# Patient Record
Sex: MALE | Race: WHITE | NOT HISPANIC OR LATINO | URBAN - METROPOLITAN AREA
[De-identification: names, ages, dates, MRNs, and addresses within clinical notes are randomized per-mention and may not be internally consistent; named-entity substitution may affect disease eponyms.]

---

## 2018-08-03 ENCOUNTER — TELEPHONE (OUTPATIENT)
Dept: ORTHOPEDICS | Facility: CLINIC | Age: 73
End: 2018-08-03

## 2018-08-03 ENCOUNTER — TELEPHONE (OUTPATIENT)
Dept: SPINE | Facility: CLINIC | Age: 73
End: 2018-08-03

## 2018-08-03 DIAGNOSIS — M54.2 CERVICAL SPINE PAIN: Primary | ICD-10-CM

## 2018-08-03 DIAGNOSIS — M54.50 LUMBAR SPINE PAIN: ICD-10-CM

## 2018-08-03 NOTE — TELEPHONE ENCOUNTER
Ortho Telephone Triage Call  [phone]  Patient C/O:  Neck pain and back pain that radiates to legs causing muscles to cramp and affecting ability to walk. Appt with Dr. Luciano/[facility] recommended per Dr. Patten.   HX: T12-L1,  lumbosacral fusion/ 1960's per Dr. Dimas/Neurosurgery  Resolution: First available appt scheduled per JAMES Luna LPN with Dr. Luciano on 8/8/18 at 2:15pm to follow xrays/[facility] at 12:45pm. Pt states understanding. Time and location of appts confirmed with pt. Pt to fax medical reports to [facility] prior to appt. Appt slips mailed

## 2018-08-08 ENCOUNTER — HOSPITAL ENCOUNTER (OUTPATIENT)
Dept: RADIOLOGY | Facility: HOSPITAL | Age: 73
Discharge: HOME | End: 2018-08-08
Attending: ORTHOPAEDIC SURGERY
Payer: MEDICARE

## 2018-08-08 ENCOUNTER — INITIAL CONSULT (OUTPATIENT)
Dept: ORTHOPEDICS | Facility: CLINIC | Age: 73
End: 2018-08-08
Payer: MEDICARE

## 2018-08-08 VITALS — WEIGHT: 240.5 LBS

## 2018-08-08 DIAGNOSIS — M54.50 LUMBAR SPINE PAIN: ICD-10-CM

## 2018-08-08 DIAGNOSIS — I73.9 CLAUDICATION: ICD-10-CM

## 2018-08-08 DIAGNOSIS — M54.2 CERVICAL SPINE PAIN: ICD-10-CM

## 2018-08-08 DIAGNOSIS — G95.9 CERVICAL MYELOPATHY: Primary | ICD-10-CM

## 2018-08-08 PROCEDURE — 72050 X-RAY EXAM NECK SPINE 4/5VWS: CPT | Mod: TC

## 2018-08-08 PROCEDURE — 72120 X-RAY BEND ONLY L-S SPINE: CPT | Mod: 26,,, | Performed by: RADIOLOGY

## 2018-08-08 PROCEDURE — 72100 X-RAY EXAM L-S SPINE 2/3 VWS: CPT | Mod: 26,,, | Performed by: RADIOLOGY

## 2018-08-08 PROCEDURE — 72050 X-RAY EXAM NECK SPINE 4/5VWS: CPT | Mod: 26,,, | Performed by: RADIOLOGY

## 2018-08-08 PROCEDURE — 99204 OFFICE O/P NEW MOD 45 MIN: CPT | Mod: S$GLB,,, | Performed by: ORTHOPAEDIC SURGERY

## 2018-08-08 PROCEDURE — 72120 X-RAY BEND ONLY L-S SPINE: CPT | Mod: TC

## 2018-08-08 PROCEDURE — 99999 PR PBB SHADOW E&M-EST. PATIENT-LVL III: CPT | Mod: PBBFAC,,, | Performed by: ORTHOPAEDIC SURGERY

## 2018-08-08 RX ORDER — TERAZOSIN 10 MG/1
10 CAPSULE ORAL NIGHTLY
COMMUNITY

## 2018-08-08 RX ORDER — AMLODIPINE BESYLATE 10 MG/1
10 TABLET ORAL DAILY
COMMUNITY

## 2018-08-08 RX ORDER — PANTOPRAZOLE SODIUM 40 MG/1
40 TABLET, DELAYED RELEASE ORAL NIGHTLY PRN
COMMUNITY

## 2018-08-08 RX ORDER — TIZANIDINE HYDROCHLORIDE 2 MG/1
1 CAPSULE, GELATIN COATED ORAL
COMMUNITY
End: 2018-11-28

## 2018-08-08 RX ORDER — SAXAGLIPTIN AND METFORMIN HYDROCHLORIDE 1000; 2.5 MG/1; MG/1
TABLET, FILM COATED, EXTENDED RELEASE ORAL
COMMUNITY

## 2018-08-08 RX ORDER — GLIMEPIRIDE 4 MG/1
4 TABLET ORAL
COMMUNITY

## 2018-08-08 RX ORDER — GABAPENTIN 300 MG/1
300 CAPSULE ORAL 4 TIMES DAILY
COMMUNITY

## 2018-08-08 RX ORDER — HYDROCHLOROTHIAZIDE 25 MG/1
25 TABLET ORAL DAILY
COMMUNITY

## 2018-08-08 RX ORDER — LISINOPRIL 40 MG/1
40 TABLET ORAL DAILY
COMMUNITY

## 2018-08-08 RX ORDER — INSULIN ASPART 100 [IU]/ML
INJECTION, SUSPENSION SUBCUTANEOUS
COMMUNITY

## 2018-08-08 NOTE — PROGRESS NOTES
DATE: 8/8/2018  PATIENT: Cleve Tong    Attending Physician: Abner Luciano M.D.    CHIEF COMPLAINT: Low back pain and leg stiffness    HISTORY:  Cleve Tong is a 73 y.o. male with a history of DISH,  spinal fusion in 1960 for fracture of the lumbar spine as well as 5/6 and 6/7 ACDF done in 2005 at [facility] who presents here for initial evaluation of low back and bilateral leg pain (Back - 4, Leg - 4). Pain is worst in the lumbar paraspinal muscles. He has pain radiating down the right leg greater than the left. He cannot stand up long. Feels better sitting down. Can only walk 15-20 feet.  The pain has been present for several years. . The patient describes the pain as achy muscle cramps.   The pain is worse with walking and improved by rest, feels like the legs are cramping up. There is some associated numbness and tingling. There is some subjective weakness when the legs start cramping up. Prior treatments have included RFA as well as TFESI which helped for a week. Also complains of headaches of cervical and lumbar spine.     The Patient does have myelopathic symptoms such as handwriting changes or difficulty with buttons/coins/keys. Denies perineal paresthesias, bowel/bladder dysfunction.   Does have balance difficulties.     PAST MEDICAL/SURGICAL HISTORY:  No past medical history on file.  No past surgical history on file.    Current Medications:   Current Outpatient Prescriptions:     amLODIPine (NORVASC) 10 MG tablet, Take 10 mg by mouth once daily., Disp: , Rfl:     gabapentin (NEURONTIN) 300 MG capsule, Take 300 mg by mouth 4 (four) times daily., Disp: , Rfl:     hydroCHLOROthiazide (HYDRODIURIL) 25 MG tablet, Take 25 mg by mouth once daily. Take on half tablet in the morning., Disp: , Rfl:     lisinopril (PRINIVIL,ZESTRIL) 40 MG tablet, Take 40 mg by mouth once daily., Disp: , Rfl:     pantoprazole (PROTONIX) 40 MG tablet, Take 40 mg by mouth nightly as needed., Disp: , Rfl:      terazosin (HYTRIN) 10 MG capsule, Take 10 mg by mouth every evening., Disp: , Rfl:     tiZANidine 2 mg Cap, Take 1 capsule by mouth. 2 mg capsule as needed for muscle spasm 2 hr from b/p med, Disp: , Rfl:     Social History:   Social History     Social History    Marital status:      Spouse name: N/A    Number of children: N/A    Years of education: N/A     Occupational History    Not on file.     Social History Main Topics    Smoking status: Never Smoker    Smokeless tobacco: Not on file    Alcohol use Not on file    Drug use: Unknown    Sexual activity: Not on file     Other Topics Concern    Not on file     Social History Narrative    No narrative on file       REVIEW OF SYSTEMS:  Constitution: Negative. Negative for chills, fever and night sweats.   Cardiovascular: Negative for chest pain and syncope.   Respiratory: Negative for cough and shortness of breath.   Gastrointestinal: See HPI. Negative for nausea/vomiting. Negative for abdominal pain.  Genitourinary: See HPI. Negative for discoloration or dysuria.  Hematologic/Lymphatic: neg for bleeding/clotting disorders.   Musculoskeletal: Negative for falls and muscle weakness.   Neurological: See HPI. no history of seizures. no history of cranial surgery or shunts.  Neurological: See HPI. No seizures.   Endocrine: Negative for polydipsia, polyphagia and polyuria.   Allergic/Immunologic: Negative for hives and persistent infections.     EXAM:  Wt 109.1 kg (240 lb 8.4 oz)     PHYSICAL EXAMINATION:    General: The patient is a pleasant  73 y.o. male in no apparent distress, the patient is orientatied to person, place and time.  Psych: Normal mood and affect  HEENT: Vision grossly intact, hearing intact to the spoken word.  Lungs: Respirations unlabored.  Gait: Normal station and gait, no difficulty with toe or heel walk.   Skin: Dorsal lumbar skin negative for rashes, lesions, hairy patches and surgical scars. There is minimal lumbar tenderness to  palpation.  Range of motion: Lumbar range of motion is acceptable.  Spinal Balance: Global saggital and coronal spinal balance acceptable, no significant for scoliosis and kyphosis.  Musculoskeletal: No pain with the range of motion of the bilateral hips. No trochanteric tenderness to palpation.  Vascular: Bilateral lower extremities warm and well perfused, Dorsalis pedis pulses 2+ bilaterally.  Neurological: Normal strength and tone in all major motor groups in the bilateral lower extremities. Normal sensation to light touch in the L2-S1 dermatomes bilaterally.  Deep tendon reflexes symmetric 2+ in the bilateral lower extremities.  Negative Babinski bilaterally. Straight leg raise negative bilaterally.    IMAGING:      Today I personally reviewed AP, Lat and Flex/Ex  upright L-spine that demonstrate  Showing previous non instrumented fusion in lumbar spine, instrumented acdf c5/6, 6/7    There is no height or weight on file to calculate BMI.  No results found for: HGBA1C    ASSESSMENT/PLAN:    Diagnoses and all orders for this visit:    Cervical myelopathy  -     MRI Cervical Spine Without Contrast; Future  -     MRI Lumbar Spine Without Contrast; Future  -     VAS US Ankle Brachial Indices Resting; Future    Claudication  -     MRI Cervical Spine Without Contrast; Future  -     MRI Lumbar Spine Without Contrast; Future  -     VAS US Ankle Brachial Indices Resting; Future        History of ACDF, DISH, Lumbar fusion with myelopathic symptms, lumbar stenosis symptoms. We will send him for MRI C-spine and Lumbar spine, YOEL to r/o PAD as he has a distant smoking history. Will f/u after testing.

## 2018-08-31 ENCOUNTER — HOSPITAL ENCOUNTER (OUTPATIENT)
Dept: RADIOLOGY | Facility: HOSPITAL | Age: 73
Discharge: HOME | End: 2018-08-31
Attending: ORTHOPAEDIC SURGERY
Payer: MEDICARE

## 2018-08-31 ENCOUNTER — HOSPITAL ENCOUNTER (OUTPATIENT)
Dept: VASCULAR SURGERY | Facility: CLINIC | Age: 73
Discharge: HOME | End: 2018-08-31
Payer: MEDICARE

## 2018-08-31 DIAGNOSIS — G95.9 CERVICAL MYELOPATHY: ICD-10-CM

## 2018-08-31 DIAGNOSIS — I73.9 CLAUDICATION: ICD-10-CM

## 2018-08-31 PROCEDURE — 72148 MRI LUMBAR SPINE W/O DYE: CPT | Mod: 26,,, | Performed by: RADIOLOGY

## 2018-08-31 PROCEDURE — 72141 MRI NECK SPINE W/O DYE: CPT | Mod: 26,,, | Performed by: RADIOLOGY

## 2018-08-31 PROCEDURE — 72141 MRI NECK SPINE W/O DYE: CPT | Mod: TC

## 2018-08-31 PROCEDURE — 93923 UPR/LXTR ART STDY 3+ LVLS: CPT | Mod: S$GLB,,, | Performed by: SURGERY

## 2018-08-31 PROCEDURE — 72148 MRI LUMBAR SPINE W/O DYE: CPT | Mod: TC

## 2018-09-10 ENCOUNTER — TELEPHONE (OUTPATIENT)
Dept: ORTHOPEDICS | Facility: CLINIC | Age: 73
End: 2018-09-10

## 2018-09-10 NOTE — TELEPHONE ENCOUNTER
Left message for patient advising that Dr. Luciano looked over his MRI'S but still needs the yoel results( which have not yest posted to his chart) , to be able to make a call on his pain. I advised that as soon as the YOEL results are in his chart, Dr. Luciano will call them to discuss a plan. I requested that they call me with any questions.

## 2018-09-28 ENCOUNTER — OFFICE VISIT (OUTPATIENT)
Dept: ORTHOPEDICS | Facility: CLINIC | Age: 73
End: 2018-09-28
Payer: MEDICARE

## 2018-09-28 ENCOUNTER — TELEPHONE (OUTPATIENT)
Dept: SPINE | Facility: CLINIC | Age: 73
End: 2018-09-28

## 2018-09-28 VITALS — WEIGHT: 240.5 LBS | BODY MASS INDEX: 33.67 KG/M2 | HEIGHT: 71 IN

## 2018-09-28 DIAGNOSIS — M41.9 SCOLIOSIS, UNSPECIFIED SCOLIOSIS TYPE, UNSPECIFIED SPINAL REGION: Primary | ICD-10-CM

## 2018-09-28 DIAGNOSIS — M43.8X9 SAGITTAL PLANE IMBALANCE: Primary | ICD-10-CM

## 2018-09-28 DIAGNOSIS — M54.16 LUMBAR RADICULOPATHY: ICD-10-CM

## 2018-09-28 PROCEDURE — 99213 OFFICE O/P EST LOW 20 MIN: CPT | Mod: S$PBB,,, | Performed by: ORTHOPAEDIC SURGERY

## 2018-09-28 PROCEDURE — 99999 PR PBB SHADOW E&M-EST. PATIENT-LVL III: CPT | Mod: PBBFAC,,, | Performed by: ORTHOPAEDIC SURGERY

## 2018-09-28 PROCEDURE — 1101F PT FALLS ASSESS-DOCD LE1/YR: CPT | Mod: CPTII,,, | Performed by: ORTHOPAEDIC SURGERY

## 2018-09-28 PROCEDURE — 99213 OFFICE O/P EST LOW 20 MIN: CPT | Mod: PBBFAC | Performed by: ORTHOPAEDIC SURGERY

## 2018-09-28 RX ORDER — ALISKIREN 150 MG/1
TABLET, FILM COATED ORAL
COMMUNITY
Start: 2013-08-26

## 2018-09-28 RX ORDER — TRAMADOL HYDROCHLORIDE 50 MG/1
50 TABLET ORAL EVERY 6 HOURS PRN
Qty: 30 TABLET | Refills: 0 | Status: SHIPPED | OUTPATIENT
Start: 2018-09-28 | End: 2018-10-08

## 2018-09-28 RX ORDER — PEN NEEDLE, DIABETIC 31 GX5/16"
NEEDLE, DISPOSABLE MISCELLANEOUS
COMMUNITY
Start: 2018-09-05

## 2018-09-28 RX ORDER — AZELASTINE HCL 205.5 UG/1
SPRAY NASAL
COMMUNITY
Start: 2018-09-12

## 2018-09-28 RX ORDER — PEN NEEDLE, DIABETIC 30 GX3/16"
NEEDLE, DISPOSABLE MISCELLANEOUS
COMMUNITY
Start: 2013-07-18

## 2018-09-30 NOTE — PROGRESS NOTES
The patient returns for follow up.    He has known lumbar spondylosis and symptomatic spinal stenosis.    He cannot walk to even get his mail.    He also has mild myelopathic symptoms.    Today I reviewed his MRI C/L spine that are notable for progressive cervical spondylosis and mild increased cord signal change.    He does have sagittal imbalance.    His ABIs are normal.    Today we discussed options, I have recommended standing scoli radiographs and a CT L spine, she will RTC afterwards.    I spent 15 minutes with the patient of which greater than 1/2 the time was devoted to counciling the patient regarding treatment options.

## 2018-10-19 ENCOUNTER — HOSPITAL ENCOUNTER (OUTPATIENT)
Dept: RADIOLOGY | Facility: HOSPITAL | Age: 73
Discharge: HOME | End: 2018-10-19
Attending: ORTHOPAEDIC SURGERY
Payer: MEDICARE

## 2018-10-19 ENCOUNTER — OFFICE VISIT (OUTPATIENT)
Dept: ORTHOPEDICS | Facility: CLINIC | Age: 73
End: 2018-10-19
Payer: MEDICARE

## 2018-10-19 VITALS — HEIGHT: 71 IN | BODY MASS INDEX: 33.98 KG/M2 | WEIGHT: 242.75 LBS

## 2018-10-19 DIAGNOSIS — M43.8X9 SAGITTAL PLANE IMBALANCE: ICD-10-CM

## 2018-10-19 DIAGNOSIS — M41.9 SCOLIOSIS, UNSPECIFIED SCOLIOSIS TYPE, UNSPECIFIED SPINAL REGION: ICD-10-CM

## 2018-10-19 DIAGNOSIS — Z98.1 STATUS POST LUMBAR SPINAL FUSION: ICD-10-CM

## 2018-10-19 DIAGNOSIS — M54.16 LUMBAR RADICULOPATHY: ICD-10-CM

## 2018-10-19 DIAGNOSIS — M43.8X9 SAGITTAL PLANE IMBALANCE: Primary | ICD-10-CM

## 2018-10-19 PROCEDURE — 1101F PT FALLS ASSESS-DOCD LE1/YR: CPT | Mod: CPTII,,, | Performed by: ORTHOPAEDIC SURGERY

## 2018-10-19 PROCEDURE — 99999 PR PBB SHADOW E&M-EST. PATIENT-LVL II: CPT | Mod: PBBFAC,,, | Performed by: ORTHOPAEDIC SURGERY

## 2018-10-19 PROCEDURE — 72131 CT LUMBAR SPINE W/O DYE: CPT | Mod: TC

## 2018-10-19 PROCEDURE — 72082 X-RAY EXAM ENTIRE SPI 2/3 VW: CPT | Mod: TC

## 2018-10-19 PROCEDURE — 99212 OFFICE O/P EST SF 10 MIN: CPT | Mod: PBBFAC,25 | Performed by: ORTHOPAEDIC SURGERY

## 2018-10-19 PROCEDURE — 72131 CT LUMBAR SPINE W/O DYE: CPT | Mod: 26,,, | Performed by: RADIOLOGY

## 2018-10-19 PROCEDURE — 72082 X-RAY EXAM ENTIRE SPI 2/3 VW: CPT | Mod: 26,,, | Performed by: RADIOLOGY

## 2018-10-19 PROCEDURE — 99213 OFFICE O/P EST LOW 20 MIN: CPT | Mod: S$PBB,,, | Performed by: ORTHOPAEDIC SURGERY

## 2018-10-19 RX ORDER — CYCLOBENZAPRINE HCL 5 MG
5 TABLET ORAL 3 TIMES DAILY PRN
Qty: 60 TABLET | Refills: 0 | Status: SHIPPED | OUTPATIENT
Start: 2018-10-19 | End: 2018-10-29

## 2018-10-20 NOTE — PROGRESS NOTES
The patient returns for follow up.    He has a history of a multilevel ACDF with known myelomalacia as well as a remote history of a noninstrumented lumbar fusion.    Today he reports bothersome R sided headaches as well as lumbar spasms.    Today I reviewed his available imaging. This is notable for Multilevel lumbar stenosis from L2/3-->L3/4.    Pelvic Incidence = 81, Pelvic Tilt = 27, Lumbar Lordosis = 58. SVA ++    A) SAgittal imbalance, history of cervical and lumbar fusions, headaches.  P) Today we discussed options, I have recommended an occipital nerve block with Dr. Cedeño. We can discuss adult spinal deformity surgery as needed.    I spent 15 minutes with the patient of which greater than 1/2 the time was devoted to counciling the patient regarding treatment options.

## 2018-10-22 ENCOUNTER — TELEPHONE (OUTPATIENT)
Dept: PAIN MEDICINE | Facility: CLINIC | Age: 73
End: 2018-10-22

## 2018-10-22 NOTE — TELEPHONE ENCOUNTER
Spoke to patient and scheduled an in-office procedure for Wednesday 10/24/18, per Dr. Luciano, for Occipital NB.  Pt is aware of the appt details and location.

## 2018-10-24 ENCOUNTER — CLINICAL SUPPORT (OUTPATIENT)
Dept: PAIN MEDICINE | Facility: CLINIC | Age: 73
End: 2018-10-24
Payer: MEDICARE

## 2018-10-24 VITALS — BODY MASS INDEX: 34.57 KG/M2 | WEIGHT: 244.5 LBS

## 2018-10-24 DIAGNOSIS — G44.86 CERVICOGENIC HEADACHE: ICD-10-CM

## 2018-10-24 DIAGNOSIS — M54.81 BILATERAL OCCIPITAL NEURALGIA: Primary | ICD-10-CM

## 2018-10-24 DIAGNOSIS — Z98.1 HX OF FUSION OF CERVICAL SPINE: ICD-10-CM

## 2018-10-24 PROCEDURE — 64405 NJX AA&/STRD GR OCPL NRV: CPT | Mod: 50,S$PBB,, | Performed by: ANESTHESIOLOGY

## 2018-10-24 PROCEDURE — 99212 OFFICE O/P EST SF 10 MIN: CPT | Mod: PBBFAC,PN,25 | Performed by: ANESTHESIOLOGY

## 2018-10-24 PROCEDURE — 99999 PR PBB SHADOW E&M-EST. PATIENT-LVL II: CPT | Mod: PBBFAC,,, | Performed by: ANESTHESIOLOGY

## 2018-10-24 PROCEDURE — 99203 OFFICE O/P NEW LOW 30 MIN: CPT | Mod: 25,S$PBB,, | Performed by: ANESTHESIOLOGY

## 2018-10-24 PROCEDURE — 64405 NJX AA&/STRD GR OCPL NRV: CPT | Mod: 50,PBBFAC,PN | Performed by: ANESTHESIOLOGY

## 2018-10-24 RX ORDER — DEXAMETHASONE SODIUM PHOSPHATE 100 MG/10ML
10 INJECTION INTRAMUSCULAR; INTRAVENOUS
Status: COMPLETED | OUTPATIENT
Start: 2018-10-24 | End: 2018-10-24

## 2018-10-24 RX ORDER — BUPIVACAINE HYDROCHLORIDE 2.5 MG/ML
4 INJECTION, SOLUTION EPIDURAL; INFILTRATION; INTRACAUDAL ONCE
Status: COMPLETED | OUTPATIENT
Start: 2018-10-24 | End: 2018-10-24

## 2018-10-24 RX ADMIN — DEXAMETHASONE SODIUM PHOSPHATE 10 MG: 10 INJECTION, SOLUTION INTRAMUSCULAR; INTRAVENOUS at 12:10

## 2018-10-24 RX ADMIN — BUPIVACAINE HYDROCHLORIDE 10 MG: 2.5 INJECTION, SOLUTION EPIDURAL; INFILTRATION; INTRACAUDAL; PERINEURAL at 12:10

## 2018-10-24 NOTE — PROGRESS NOTES
Chronic Pain - New Consult        Chief Complaint   Patient presents with    Headache     referred for occipital NB        SUBJECTIVE:    Cleve Tong is a 72 y/o male with hx of ACDF (C4-7) and lumbar fusion who presents to the clinic for the evaluation of headache. The pain started 2-3 years ago and symptoms have been worsening. No trauma or inciting event. The pain is located in the bilateral occipital area and radiates into the front of the head. He also complains of pain in the left cervical paraspinal area. He denies pain radiating down the arm. He describes the headache as pulsating and is rated as 8/10. The pain is rated with a score of  3/10 on the AVERAGE day and a score of 10/10 on the WORST day.  Symptoms interfere with daily activity and sleeping. The pain is exacerbated by physical activity and head turning.  The pain is mitigated by nothing. He has tried heat, OTC Advil and Aleve with no improvement. He was referred by Dr. Luciano for occipital nerve block.    Patient denies bowel/bladder incontinence, significant motor weakness. He reports occasional numbness in the right hand.    Physical Therapy/Home Exercise: no      Pain Disability Index Review:  Last 3 PDI Scores 10/24/2018   Pain Disability Index (PDI) 56       Pain Medications:    - Gabapentin 300 mg     report:  Reviewed    Pain Procedures: None    Imaging:     MRI Cervical (8/31/2018):    FINDINGS:  The alignment of the cervical spine is normal.  Anterior instrumented fusion from C4 through the C7.  Large bridging anterior osteophyte at C2-C3 and C4 better seen on the most recent radiograph 08/08/2018.  No evidence of malignant bone marrow replacement process or infection.  The craniocervical junction appear normal.  There is a tiny area of cord signal abnormality measuring about 1-2 mm within the left side of the cord at C6 level suggestive of a  very small area of myelomalacia, new or more conspicuous compared to the prior exam.   There is ossification posterior to C6 and C7 vertebrae possibly calcification of the posterior longitudinal ligament contributing to some degree of central canal stenosis.    C2-C3: Minimal posterior disc osteophyte complex, no central canal stenosis or foraminal narrowing.    C3-C4: Minimal posterior disc osteophyte complex with bilateral mild uncovertebral spur, there is no central canal stenosis there is mild bilateral foraminal narrowing.    C4-C5: Bilateral uncovertebral spur.  Mild left foraminal narrowing.  No greater than mild central canal narrowing.    C5-C6: Ossification posterior to the C6 vertebrae cause moderate central canal stenosis.  There is mild bilateral foraminal narrowing.    C6-C7: Ossific a shin posterior to the C7 vertebrae cause moderate central canal stenosis.  There is mild bilateral foraminal narrowing.    C7-T1: No central canal stenosis or foraminal narrowing.    The paraspinal soft tissues demonstrate a heterogeneous appearing thyroid gland with nodules, somewhat similar to the prior exam.            Past Medical History:   Diagnosis Date    Diabetes mellitus, type 2     Hypertension      Past Surgical History:   Procedure Laterality Date    BACK SURGERY      NECK SURGERY       Social History     Socioeconomic History    Marital status:      Spouse name: Not on file    Number of children: Not on file    Years of education: Not on file    Highest education level: Not on file   Social Needs    Financial resource strain: Not on file    Food insecurity - worry: Not on file    Food insecurity - inability: Not on file    Transportation needs - medical: Not on file    Transportation needs - non-medical: Not on file   Occupational History    Not on file   Tobacco Use    Smoking status: Never Smoker    Smokeless tobacco: Never Used   Substance and Sexual Activity    Alcohol use: No     Frequency: Never    Drug use: No    Sexual activity: No   Other Topics Concern     "Not on file   Social History Narrative    Not on file     Family History   Problem Relation Age of Onset    Cancer Sister        Review of patient's allergies indicates:   Allergen Reactions    Doxycycline hyclate     Demerol [meperidine]     Doxycycline Swelling    Methocarbamol     Penicillin g     Codeine Rash    Penicillins Rash       Current Outpatient Medications   Medication Sig    aliskiren (TEKTURNA) 150 MG Tab     amLODIPine (NORVASC) 10 MG tablet Take 10 mg by mouth once daily.    azelastine 0.15 % (205.5 mcg) Spry     BD ULTRA-FINE GABREILA PEN NEEDLE 32 gauge x 5/32" Ndle     cyclobenzaprine (FLEXERIL) 5 MG tablet Take 1 tablet (5 mg total) by mouth 3 (three) times daily as needed for Muscle spasms.    gabapentin (NEURONTIN) 300 MG capsule Take 300 mg by mouth 4 (four) times daily.    glimepiride (AMARYL) 4 MG tablet Take 4 mg by mouth before breakfast. TAKE 2 IN MORNING    hydroCHLOROthiazide (HYDRODIURIL) 25 MG tablet Take 25 mg by mouth once daily. Take on half tablet in the morning.    insulin aspart protamine-insulin aspart (NOVOLOG 70/30) 100 unit/mL (70-30) InPn pen Inject into the skin 2 (two) times daily before meals. 15 UNITS WITH BREAKFAST , 10 UNITS WITH SUPPER    insulin detemir U-100 (LEVEMIR) 100 unit/mL injection Inject 5 Units into the skin.    lisinopril (PRINIVIL,ZESTRIL) 40 MG tablet Take 40 mg by mouth once daily.    pantoprazole (PROTONIX) 40 MG tablet Take 40 mg by mouth nightly as needed.    pen needle, diabetic (BD ULTRA-FINE SHORT PEN NEEDLE) 31 gauge x 5/16" Ndle     SAXagliptin-metformin (KOMBIGLYZE XR) 2.5-1,000 mg TM24 Take by mouth. 2 MG IN MORNING    terazosin (HYTRIN) 10 MG capsule Take 10 mg by mouth every evening.    tiZANidine 2 mg Cap Take 1 capsule by mouth. 2 mg capsule as needed for muscle spasm 2 hr from b/p med     No current facility-administered medications for this visit.        REVIEW OF SYSTEMS:  GENERAL: No weight loss, malaise or " fevers.  HEENT: + occipital headache  RESPIRATORY: Negative for wheezing or shortness of breath.  CARDIOVASCULAR: Negative for chest pain or palpitations.  GI: No blood in stools or black stools or change in bowel habits.  : Negative for kidney stones, urinary tract infections, or incontinence.  MUSCULOSKELETAL: See HPI  SKIN: Negative for rash or itching.  PSYCH: + sleep disturbance  HEMATOLOGY/LYMPHOLOGY Negative for prolonged bleeding, bruising easily or swollen nodes.  NEURO:  No history of seizures or tremors.    OBJECTIVE:    Wt 110.9 kg (244 lb 7.8 oz)   BMI 34.57 kg/m²     PHYSICAL EXAMINATION:  GENERAL: Well appearing, in no acute distress.  PSYCH:  Mood and affect is appropriate.  Awake, alert, and oriented x 3.  SKIN: Skin color, texture, turgor normal, no rashes or lesions  HEENT: Normocephalic, atraumatic.  EOM intact.  CV: Radial pulses are 2+.  RESP:  Respirations are unlabored.  MSK:  No atrophy or tone abnormalities are noted.      Neck: Tenderness to palpation over the cervical paraspinous muscles on the LEFT. Pain with neck extension and lateral rotation.  No obvious deformity or signs of trauma. ROM is limited in all planes.    Back: No pain to palpation over the lumbar spine and paraspinous muscles.  Normal range of motion without pain reproduction.    Extremities:  Peripheral joint ROM is full and pain free without obvious instability or laxity in all four extremities. No edema or skin discolorations noted.     Gait:  Gait is normal.    NEUR:  Strength testing is 5/5 throughout all muscle groups in the upper and lower extremities. No loss of sensation is noted.     ASSESSMENT:     1. Bilateral occipital neuralgia    2. Cervicogenic headache    3. Hx of fusion of cervical spine          PLAN:     - I have stressed the importance of physical activity and a home exercise plan to help with pain and improve health.  - Bilateral greater occipital nerve block in clinic today.  - Will consider third  occipital nerve and C3 medial branch block if no improvement with above.  - RTC in 4 weeks.    The above plan and management options were discussed at length with patient. Patient is in agreement with the above and verbalized understanding. It will be communicated with the referring physician via electronic record, fax, or mail.    Jeff C Tom CORADO  10/24/2018     Patient Name: Cleve Tong  MRN: [id number]    INFORMED CONSENT: The procedure, risks, benefits and options were discussed with patient. There are no contraindications to the procedure. The patient expressed understanding and agreed to proceed. The personnel performing the procedure was discussed. I verify that I personally obtained Cleve's consent prior to the start of the procedure and the signed consent can be found on the patient's chart.      Sedation: None      PROCEDURE: BILATERAL GREATER OCCIPITAL NERVE BLOCK  The patient was placed in a seated position. The site of pain and procedure were confirmed with the patient prior to starting the procedure. The patient's nuchal ridge of the occipital bone was identified and prepped with chlorhexidine. A 25g gauge 1 inch was advanced through the skin and subcutaneous tissues lateral to the occipital protuberance in the area of the Greater Occipital Nerve.  Aspiration for blood and CSF was negative.  A total of 2.5 ml of Bupivacaine 0.25% and 5 mg Decadron was injected on each side.  There were no signs or symptoms of intravascular injection. No complications were evident. No specimens collected.      Blood Loss: Nil  Specimen: None    Pre Procedure Pain Level: 8/10  Post-Procedure Pain Level: 4/10        Discharge Diagnosis: Same as Pre and Post Procedure  Condition on Discharge: Stable.  Diet on Discharge: Same as before.  Activity: as per instruction sheet.  Discharge to: Home with a responsible adult.  Follow up: as per Discharge instructions    Jeff VAMSI Tom MAK  10/24/2018

## 2018-11-28 ENCOUNTER — OFFICE VISIT (OUTPATIENT)
Dept: PAIN MEDICINE | Facility: CLINIC | Age: 73
End: 2018-11-28
Payer: MEDICARE

## 2018-11-28 VITALS
HEIGHT: 71 IN | DIASTOLIC BLOOD PRESSURE: 74 MMHG | HEART RATE: 70 BPM | SYSTOLIC BLOOD PRESSURE: 154 MMHG | WEIGHT: 240.94 LBS | BODY MASS INDEX: 33.73 KG/M2

## 2018-11-28 DIAGNOSIS — Z98.1 HISTORY OF LUMBAR FUSION: ICD-10-CM

## 2018-11-28 DIAGNOSIS — M62.838 MUSCLE SPASM: ICD-10-CM

## 2018-11-28 DIAGNOSIS — G44.86 CERVICOGENIC HEADACHE: ICD-10-CM

## 2018-11-28 DIAGNOSIS — Z98.1 HX OF FUSION OF CERVICAL SPINE: ICD-10-CM

## 2018-11-28 DIAGNOSIS — M54.81 BILATERAL OCCIPITAL NEURALGIA: Primary | ICD-10-CM

## 2018-11-28 PROCEDURE — 99214 OFFICE O/P EST MOD 30 MIN: CPT | Mod: S$GLB,,, | Performed by: ANESTHESIOLOGY

## 2018-11-28 PROCEDURE — 1101F PT FALLS ASSESS-DOCD LE1/YR: CPT | Mod: CPTII,S$GLB,, | Performed by: ANESTHESIOLOGY

## 2018-11-28 PROCEDURE — 99999 PR PBB SHADOW E&M-EST. PATIENT-LVL III: CPT | Mod: PBBFAC,,, | Performed by: ANESTHESIOLOGY

## 2018-11-28 RX ORDER — CYCLOBENZAPRINE HCL 5 MG
5 TABLET ORAL 3 TIMES DAILY PRN
Qty: 90 TABLET | Refills: 1 | Status: SHIPPED | OUTPATIENT
Start: 2018-11-28 | End: 2018-12-28

## 2018-11-28 NOTE — PROGRESS NOTES
Chronic Pain - Established      INTERVAL HISTORY (11/28/2018):    Cleve Tong presents to the clinic today for a follow-up appointment for occipital headache. Since the last visit, the headaches have been improving. The patient reports 50% pain relief after Bilateral occipital nerve block which continues. Current pain intensity is 3/10. He has a hx of lumbar fusion while in his teens. He continues to experience a pain in the bilateral lumbar paraspinal area that radiates into the posterior thighs. He denies weakness in the legs. He has has lumbar ESIs and RFA with little improvement. He has been taking Flexeril three times daily for low back and leg cramps which helps significantly. He is requesting a refill today.       SUBJECTIVE:    Cleve Tong is a 74 y/o male with hx of ACDF (C4-7) and lumbar fusion who presents to the clinic for the evaluation of headache. The pain started 2-3 years ago and symptoms have been worsening. No trauma or inciting event. The pain is located in the bilateral occipital area and radiates into the front of the head. He also complains of pain in the left cervical paraspinal area. He denies pain radiating down the arm. He describes the headache as pulsating and is rated as 8/10. The pain is rated with a score of  3/10 on the AVERAGE day and a score of 10/10 on the WORST day.  Symptoms interfere with daily activity and sleeping. The pain is exacerbated by physical activity and head turning.  The pain is mitigated by nothing. He has tried heat, OTC Advil and Aleve with no improvement. He was referred by Dr. Luciano for occipital nerve block.    Patient denies bowel/bladder incontinence, significant motor weakness. He reports occasional numbness in the right hand.    Physical Therapy/Home Exercise: no      Pain Disability Index Review:  Last 3 PDI Scores 11/28/2018 10/24/2018   Pain Disability Index (PDI) 68 56       Pain Medications:    - Gabapentin 300 mg  - Flexeril 5 mg  TID     report:  Reviewed    Pain Procedures: None    Imaging:     MRI Cervical (8/31/2018):    FINDINGS:  The alignment of the cervical spine is normal.  Anterior instrumented fusion from C4 through the C7.  Large bridging anterior osteophyte at C2-C3 and C4 better seen on the most recent radiograph 08/08/2018.  No evidence of malignant bone marrow replacement process or infection.  The craniocervical junction appear normal.  There is a tiny area of cord signal abnormality measuring about 1-2 mm within the left side of the cord at C6 level suggestive of a  very small area of myelomalacia, new or more conspicuous compared to the prior exam.  There is ossification posterior to C6 and C7 vertebrae possibly calcification of the posterior longitudinal ligament contributing to some degree of central canal stenosis.    C2-C3: Minimal posterior disc osteophyte complex, no central canal stenosis or foraminal narrowing.    C3-C4: Minimal posterior disc osteophyte complex with bilateral mild uncovertebral spur, there is no central canal stenosis there is mild bilateral foraminal narrowing.    C4-C5: Bilateral uncovertebral spur.  Mild left foraminal narrowing.  No greater than mild central canal narrowing.    C5-C6: Ossification posterior to the C6 vertebrae cause moderate central canal stenosis.  There is mild bilateral foraminal narrowing.    C6-C7: Ossific a shin posterior to the C7 vertebrae cause moderate central canal stenosis.  There is mild bilateral foraminal narrowing.    C7-T1: No central canal stenosis or foraminal narrowing.    The paraspinal soft tissues demonstrate a heterogeneous appearing thyroid gland with nodules, somewhat similar to the prior exam.         Past Medical History:   Diagnosis Date    Diabetes mellitus, type 2     Hypertension      Past Surgical History:   Procedure Laterality Date    BACK SURGERY      NECK SURGERY       Social History     Socioeconomic History    Marital status:  "     Spouse name: Not on file    Number of children: Not on file    Years of education: Not on file    Highest education level: Not on file   Social Needs    Financial resource strain: Not on file    Food insecurity - worry: Not on file    Food insecurity - inability: Not on file    Transportation needs - medical: Not on file    Transportation needs - non-medical: Not on file   Occupational History    Not on file   Tobacco Use    Smoking status: Never Smoker    Smokeless tobacco: Never Used   Substance and Sexual Activity    Alcohol use: No     Frequency: Never    Drug use: No    Sexual activity: No   Other Topics Concern    Not on file   Social History Narrative    Not on file     Family History   Problem Relation Age of Onset    Cancer Sister        Review of patient's allergies indicates:   Allergen Reactions    Doxycycline hyclate     Demerol [meperidine]     Doxycycline Swelling    Methocarbamol     Penicillin g     Codeine Rash    Penicillins Rash       Current Outpatient Medications   Medication Sig    aliskiren (TEKTURNA) 150 MG Tab     amLODIPine (NORVASC) 10 MG tablet Take 10 mg by mouth once daily.    azelastine 0.15 % (205.5 mcg) Spry     BD ULTRA-FINE GABRIELA PEN NEEDLE 32 gauge x 5/32" Ndle     gabapentin (NEURONTIN) 300 MG capsule Take 300 mg by mouth 4 (four) times daily.    glimepiride (AMARYL) 4 MG tablet Take 4 mg by mouth before breakfast. TAKE 2 IN MORNING    hydroCHLOROthiazide (HYDRODIURIL) 25 MG tablet Take 25 mg by mouth once daily. Take on half tablet in the morning.    insulin aspart protamine-insulin aspart (NOVOLOG 70/30) 100 unit/mL (70-30) InPn pen Inject into the skin 2 (two) times daily before meals. 15 UNITS WITH BREAKFAST , 10 UNITS WITH SUPPER    insulin detemir U-100 (LEVEMIR) 100 unit/mL injection Inject 5 Units into the skin.    lisinopril (PRINIVIL,ZESTRIL) 40 MG tablet Take 40 mg by mouth once daily.    pantoprazole (PROTONIX) 40 MG " "tablet Take 40 mg by mouth nightly as needed.    pen needle, diabetic (BD ULTRA-FINE SHORT PEN NEEDLE) 31 gauge x 5/16" Ndle     SAXagliptin-metformin (KOMBIGLYZE XR) 2.5-1,000 mg TM24 Take by mouth. 2 MG IN MORNING    terazosin (HYTRIN) 10 MG capsule Take 10 mg by mouth every evening.    cyclobenzaprine (FLEXERIL) 5 MG tablet Take 1 tablet (5 mg total) by mouth 3 (three) times daily as needed for Muscle spasms.     No current facility-administered medications for this visit.        REVIEW OF SYSTEMS:  GENERAL: No weight loss, malaise or fevers.  HEENT: + occipital headache  RESPIRATORY: Negative for wheezing or shortness of breath.  CARDIOVASCULAR: Negative for chest pain or palpitations.  GI: No blood in stools or black stools or change in bowel habits.  : Negative for kidney stones, urinary tract infections, or incontinence.  MUSCULOSKELETAL: See HPI  SKIN: Negative for rash or itching.  PSYCH: + sleep disturbance  HEMATOLOGY/LYMPHOLOGY Negative for prolonged bleeding, bruising easily or swollen nodes.  NEURO:  No history of seizures or tremors.    OBJECTIVE:    BP (!) 154/74   Pulse 70   Ht 5' 10.51" (1.791 m)   Wt 109.3 kg (240 lb 15.4 oz)   BMI 34.08 kg/m²     PHYSICAL EXAMINATION:  GENERAL: Well appearing, in no acute distress.  PSYCH:  Mood and affect is appropriate.  Awake, alert, and oriented x 3.  SKIN: Skin color, texture, turgor normal, no rashes or lesions  HEENT: Normocephalic, atraumatic.  EOM intact.  CV: Radial pulses are 2+.  RESP:  Respirations are unlabored.  MSK:  No atrophy or tone abnormalities are noted.      Neck: No obvious deformity or signs of trauma. ROM is limited in all planes.    Back: Mild tenderness to palpation over lumbar paraspinous muscles. Pain with back extension.    Extremities:  Peripheral joint ROM is full and pain free without obvious instability or laxity in all four extremities. No edema or skin discolorations noted.     Gait:  Gait is normal.    NEUR:  " Strength testing is 5/5 throughout all muscle groups in the upper and lower extremities. No loss of sensation is noted.     ASSESSMENT:     1. Bilateral occipital neuralgia    2. Cervicogenic headache    3. Hx of fusion of cervical spine    4. History of lumbar fusion    5. Muscle spasm          PLAN:     - I have stressed the importance of physical activity and a home exercise plan to help with pain and improve health.  - Will repeat bilateral greater occipital nerve block in future as needed for HA.  - Refill Flexeril 5 mg TID as needed for muscle spasm. OK to continue per PCP.  - Return to clinic with any new or worsening symptoms, or if symptoms persist.      The above plan and management options were discussed at length with patient. Patient is in agreement with the above and verbalized understanding. It will be communicated with the referring physician via electronic record, fax, or mail.    Jeff Santos III  11/28/2018

## 2020-06-17 RX ORDER — CYCLOBENZAPRINE HCL 5 MG
TABLET ORAL
Qty: 60 TABLET | Refills: 0 | OUTPATIENT
Start: 2020-06-17